# Patient Record
Sex: FEMALE | Race: WHITE | Employment: OTHER | URBAN - METROPOLITAN AREA
[De-identification: names, ages, dates, MRNs, and addresses within clinical notes are randomized per-mention and may not be internally consistent; named-entity substitution may affect disease eponyms.]

---

## 2017-12-07 ENCOUNTER — HOSPITAL ENCOUNTER (OUTPATIENT)
Dept: MAMMOGRAPHY | Age: 47
Discharge: HOME | End: 2017-12-07
Attending: OBSTETRICS & GYNECOLOGY
Payer: COMMERCIAL

## 2017-12-07 DIAGNOSIS — Z12.31 VISIT FOR SCREENING MAMMOGRAM: ICD-10-CM

## 2017-12-07 PROCEDURE — 77067 SCR MAMMO BI INCL CAD: CPT

## 2019-04-18 ENCOUNTER — HOSPITAL ENCOUNTER (OUTPATIENT)
Dept: MAMMOGRAPHY | Age: 49
Discharge: HOME | End: 2019-04-18
Attending: OBSTETRICS & GYNECOLOGY
Payer: SELF-PAY

## 2019-04-18 DIAGNOSIS — Z12.31 VISIT FOR SCREENING MAMMOGRAM: ICD-10-CM

## 2019-04-18 PROCEDURE — 77063 BREAST TOMOSYNTHESIS BI: CPT

## 2020-12-01 ENCOUNTER — TRANSCRIBE ORDER (OUTPATIENT)
Dept: SCHEDULING | Age: 50
End: 2020-12-01

## 2020-12-01 DIAGNOSIS — Z12.31 SCREENING MAMMOGRAM FOR HIGH-RISK PATIENT: Primary | ICD-10-CM

## 2021-11-30 ENCOUNTER — NURSE TRIAGE (OUTPATIENT)
Dept: OTHER | Facility: CLINIC | Age: 51
End: 2021-11-30

## 2021-11-30 NOTE — TELEPHONE ENCOUNTER
Transferred pt to the MD office she was trying to get ahold of. She was disconnected and was calling back to finish the appt.       Reason for Disposition   Caller has already spoken to PCP or another triager    Protocols used: INFORMATION ONLY CALL - NO TRIAGE-ADULT-AH

## 2021-11-30 NOTE — TELEPHONE ENCOUNTER
Received call from Obie Vasquez at [facility], caller not on line. Complaint: heart racing    Market: Ximena Campbell Name: No current PCP. Would like to establish care at [place] [address]. Caller's telephone number verified as [phone]    Connected with caller via phone, please see below triage    Reason for Disposition   [1] Palpitations AND [2] no improvement after using CARE ADVICE    Answer Assessment - Initial Assessment Questions  1. DESCRIPTION: \"Please describe your heart rate or heart beat that you are having\" (e.g., fast/slow, regular/irregular, skipped or extra beats, \"palpitations\")      Seemed like she has had heartbeat that has been too fast for few mins for a few times over the last 2-3 months    2. ONSET: \"When did it start? \" (Minutes, hours or days)       2-3 months ago    3. DURATION: \"How long does it last\" (e.g., seconds, minutes, hours)      Most was about 10 mins, typically around 2 mins    4. PATTERN \"Does it come and go, or has it been constant since it started? \"  \"Does it get worse with exertion? \"   \"Are you feeling it now? \"      Comes and goes. Not feeling now, but feels like it is becoming for more frequent. 5. TAP: \"Using your hand, can you tap out what you are feeling on a chair or table in front of you, so that I can hear? \" (Note: not all patients can do this)        Unable to do    6. HEART RATE: \"Can you tell me your heart rate? \" \"How many beats in 15 seconds? \"  (Note: not all patients can do this)        72 bpm    7. RECURRENT SYMPTOM: \"Have you ever had this before? \" If so, ask: \"When was the last time? \" and \"What happened that time? \"       Maybe 2 random episodes over the last 10-15 years ago (different- chest pain involved)    8. CAUSE: \"What do you think is causing the palpitations? \"      Olimpia Jt- has been drinking a lot more coffee/caffeine recently    9. CARDIAC HISTORY: \"Do you have any history of heart disease? \" (e.g., heart attack, angina, bypass surgery, angioplasty, arrhythmia)       Denies    10. OTHER SYMPTOMS: \"Do you have any other symptoms? \" (e.g., dizziness, chest pain, sweating, difficulty breathing)        Sometimes dizziness/sob with some episodes (not every time)    11. PREGNANCY: \"Is there any chance you are pregnant? \" \"When was your last menstrual period? \"        N/a due to age    Protocols used: HEART RATE AND HEARTBEAT QUESTIONS-ADULT-AH      Brief description of triage: Pt called with concern of 5-10 episodes of racing heart, w/ occasional SOB and dizziness during, X 2-3 months. No current symptoms. Pt's mother spoke with someone at [address] [place] [address], who stated they would potentially work pt in within needed timeframe. Otherwise, pt will establish at [place] [address]. Pt currently has no PCP. Triage indicates for patient to see PCP within 3 days. Care advice provided, patient verbalizes understanding; denies any other questions or concerns; instructed to call back for any new or worsening symptoms. Pt disconnected call before getting to  or PCP office for scheduling. Returned pt call. Pt states she was able to get appt at [address] [place] [address] on 12/21/21. Re-emphasized need for 3 day disposition. Advised pt to follow up at ED if no appts available at [place] [address]. Writer warm transferred call to [facility] for scheduling. Unable to route encounter. Attention Provider: Thank you for allowing me to participate in the care of your patient. The patient was connected to triage in response to information provided to the [facility]. Please do not respond through this encounter as the response is not directed to a shared pool.

## 2021-12-21 ENCOUNTER — OFFICE VISIT (OUTPATIENT)
Dept: INTERNAL MEDICINE CLINIC | Age: 51
End: 2021-12-21
Payer: COMMERCIAL

## 2021-12-21 VITALS
BODY MASS INDEX: 22.53 KG/M2 | SYSTOLIC BLOOD PRESSURE: 114 MMHG | WEIGHT: 132 LBS | RESPIRATION RATE: 16 BRPM | OXYGEN SATURATION: 98 % | HEIGHT: 64 IN | HEART RATE: 74 BPM | TEMPERATURE: 97.3 F | DIASTOLIC BLOOD PRESSURE: 74 MMHG

## 2021-12-21 DIAGNOSIS — R00.2 PALPITATION: Primary | ICD-10-CM

## 2021-12-21 DIAGNOSIS — E78.00 ELEVATED LDL CHOLESTEROL LEVEL: ICD-10-CM

## 2021-12-21 PROCEDURE — 93000 ELECTROCARDIOGRAM COMPLETE: CPT | Performed by: INTERNAL MEDICINE

## 2021-12-21 PROCEDURE — 99204 OFFICE O/P NEW MOD 45 MIN: CPT | Performed by: INTERNAL MEDICINE

## 2021-12-21 NOTE — PROGRESS NOTES
Verified name and birth date for privacy precautions. Chart reviewed in preparation for today's visit. Chief Complaint   Patient presents with   [address] Maintenance Due   Topic    Hepatitis C Screening     COVID-19 Vaccine (1)    DTaP/Tdap/Td series (1 - Tdap)    Cervical cancer screen     Lipid Screen     Colorectal Cancer Screening Combo     Flu Vaccine (1)    Shingrix Vaccine Age 50> (1 of 2)    Breast Cancer Screen Mammogram          Wt Readings from Last 3 Encounters:   12/21/21 132 lb (59.9 kg)   06/23/16 128 lb 4.8 oz (58.2 kg)   11/15/12 126 lb 6.4 oz (57.3 kg)     Temp Readings from Last 3 Encounters:   12/21/21 97.3 °F (36.3 °C) (Temporal)   06/23/16 97 °F (36.1 °C) (Oral)   11/15/12 97.7 °F (36.5 °C) (Oral)     BP Readings from Last 3 Encounters:   12/21/21 114/74   06/23/16 90/60   11/15/12 106/62     Pulse Readings from Last 3 Encounters:   12/21/21 74   06/23/16 (!) 53   11/15/12 60         Learning Assessment:  :     No flowsheet data found. Depression Screening:  :     3 most recent PHQ Screens 12/21/2021   Little interest or pleasure in doing things Not at all   Feeling down, depressed, irritable, or hopeless Not at all   Total Score PHQ 2 0       Fall Risk Assessment:  :     No flowsheet data found. Abuse Screening:  :     Abuse Screening Questionnaire 12/21/2021   Do you ever feel afraid of your partner? N   Are you in a relationship with someone who physically or mentally threatens you? N   Is it safe for you to go home?  Nathalie Zeng

## 2021-12-21 NOTE — PATIENT INSTRUCTIONS
Palpitations: Care Instructions  Your Care Instructions     Heart palpitations are the uncomfortable sensation that your heart is beating fast or irregularly. You might feel pounding or fluttering in your chest. It might feel like your heart is skipping a beat. Although palpitations may be caused by a heart problem, they also occur because of stress, fatigue, or use of alcohol, caffeine, or nicotine. Many medicines, including diet pills, antihistamines, decongestants, and some herbal products, can cause heart palpitations. Nearly everyone has palpitations from time to time. Depending on your symptoms, your doctor may need to do more tests to try to find the cause of your palpitations. Follow-up care is a key part of your treatment and safety. Be sure to make and go to all appointments, and call your doctor if you are having problems. It's also a good idea to know your test results and keep a list of the medicines you take. How can you care for yourself at home? · Avoid caffeine, nicotine, and excess alcohol. · Do not take illegal drugs, such as methamphetamines and cocaine. · Do not take weight loss or diet medicines unless you talk with your doctor first.  · Get plenty of sleep. · Do not overeat. · If you have palpitations again, take deep breaths and try to relax. This may slow a racing heart. · If you start to feel lightheaded, lie down to avoid injuries that might result if you pass out and fall down. · Keep a record of your palpitations and bring it to your next doctor's appointment. Write down:  ? The date and time. ? Your pulse. (If your heart is beating fast, it may be hard to count your pulse.)  ? What you were doing when the palpitations started. ? How long the palpitations lasted. ? Any other symptoms. · If an activity causes palpitations, slow down or stop. Talk to your doctor before you do that activity again. · Take your medicines exactly as prescribed.  Call your doctor if you think you are having a problem with your medicine. When should you call for help? Call 911 anytime you think you may need emergency care. For example, call if:    · You passed out (lost consciousness).     · You have symptoms of a heart attack. These may include:  ? Chest pain or pressure, or a strange feeling in the chest.  ? Sweating. ? Shortness of breath. ? Pain, pressure, or a strange feeling in the back, neck, jaw, or upper belly or in one or both shoulders or arms. ? Lightheadedness or sudden weakness. ? A fast or irregular heartbeat. After you call 911, the  may tell you to chew 1 adult-strength or 2 to 4 low-dose aspirin. Wait for an ambulance. Do not try to drive yourself.     · You have symptoms of a stroke. These may include:  ? Sudden numbness, tingling, weakness, or loss of movement in your face, arm, or leg, especially on only one side of your body. ? Sudden vision changes. ? Sudden trouble speaking. ? Sudden confusion or trouble understanding simple statements. ? Sudden problems with walking or balance. ? A sudden, severe headache that is different from past headaches. Call your doctor now or seek immediate medical care if:    · You have heart palpitations and:  ? Are dizzy or lightheaded, or you feel like you may faint. ? Have new or increased shortness of breath. Watch closely for changes in your health, and be sure to contact your doctor if:    · You continue to have heart palpitations. Where can you learn more? Go to http://www.gray.com/  Enter R508 in the search box to learn more about \"Palpitations: Care Instructions. \"  Current as of: April 29, 2021               Content Version: 13.0  © 7403-8792 [employer]. Care instructions adapted under license by [employer] (which disclaims liability or warranty for this information).  If you have questions about a medical condition or this instruction, always ask your healthcare professional. [address] any warranty or liability for your use of this information.

## 2021-12-21 NOTE — PROGRESS NOTES
HISTORY OF PRESENT ILLNESS  Darek Pedroza is a 49 y.o. female. HPI  Assessment: Ms. Lm Pandey is seen today for an introductory visit with concerns regarding palpitations. She has previously been seen in our group but has not really required a regular primary care physician. Her parents come to my practice. New problem reviewed, palpitations. For about the last few months, she has had about eight spells of noticing shortness of breath associated with palpitations. The shortness of breath was associated with doing chores outside. The palpitations also are noted at other times. She thought caffeine may have been the trigger and she has cut back on this and the symptoms have improved. Prior to the decrease of caffeine, the intensity of the symptoms had been increasing. She denies any chest pains. Her EKG in the office here is unremarkable. Her past medical history is negative for any cardiac problem. We decided on a plan of continued role of caffeine intake, checking labs to assure no difficulties with electrolytes and thyroid. She will let me know if she has recurrent symptoms as a cardiologist consultation may be in order. Chronic Medical Problems: She has none. Past Surgical History: No surgeries noted. Social History: Notable for her working as a [employer] . She is single with no children. Brief Preventive Care Review: She is due for all of her vaccinations. She is up to date with pap smear and mammogram. We will put a request in for these from her gyn physician. No current outpatient medications on file. No current facility-administered medications for this visit. Allergies   Allergen Reactions    Pcn [Penicillins] Hives     20 years ago       Review of Systems   Constitutional: Negative for chills, fever and weight loss. Respiratory: Positive for shortness of breath. Cardiovascular: Positive for palpitations. Negative for chest pain, leg swelling and PND. Musculoskeletal: Negative for myalgias. Neurological: Negative for focal weakness. Physical Exam  Vitals and nursing note reviewed. Neck:      Thyroid: No thyroid mass, thyromegaly or thyroid tenderness. Vascular: No carotid bruit. Cardiovascular:      Rate and Rhythm: Normal rate and regular rhythm. Heart sounds: No murmur heard. No friction rub. No gallop. Pulmonary:      Effort: Pulmonary effort is normal. No respiratory distress. Breath sounds: Normal breath sounds. Musculoskeletal:      Right lower leg: No edema. Left lower leg: No edema. ASSESSMENT and PLAN  Diagnoses and all orders for this visit:    1. Palpitation  -     URINALYSIS W/ RFLX MICROSCOPIC; Future  -     LIPID PANEL; Future    2. Elevated LDL cholesterol level  -     AMB POC EKG ROUTINE W/ 12 LEADS, INTER & REP  -     TSH 3RD GENERATION; Future  -     CBC WITH AUTOMATED DIFF; Future  -     METABOLIC PANEL, COMPREHENSIVE;  Future

## 2021-12-27 DIAGNOSIS — E87.6 HYPOKALEMIA: Primary | ICD-10-CM
